# Patient Record
Sex: MALE | URBAN - METROPOLITAN AREA
[De-identification: names, ages, dates, MRNs, and addresses within clinical notes are randomized per-mention and may not be internally consistent; named-entity substitution may affect disease eponyms.]

---

## 2023-09-29 ENCOUNTER — APPOINTMENT (RX ONLY)
Dept: URBAN - METROPOLITAN AREA CLINIC 114 | Facility: CLINIC | Age: 23
Setting detail: DERMATOLOGY
End: 2023-09-29

## 2023-09-29 DIAGNOSIS — B35.1 TINEA UNGUIUM: ICD-10-CM

## 2023-09-29 DIAGNOSIS — D22 MELANOCYTIC NEVI: ICD-10-CM

## 2023-09-29 DIAGNOSIS — B36.0 PITYRIASIS VERSICOLOR: ICD-10-CM

## 2023-09-29 PROBLEM — D22.71 MELANOCYTIC NEVI OF RIGHT LOWER LIMB, INCLUDING HIP: Status: ACTIVE | Noted: 2023-09-29

## 2023-09-29 PROCEDURE — ? ORDER TESTS

## 2023-09-29 PROCEDURE — ? PRESCRIPTION

## 2023-09-29 PROCEDURE — ? PHOTO-DOCUMENTATION

## 2023-09-29 PROCEDURE — 99203 OFFICE O/P NEW LOW 30 MIN: CPT

## 2023-09-29 PROCEDURE — 87220 TISSUE EXAM FOR FUNGI: CPT

## 2023-09-29 PROCEDURE — ? OBSERVATION AND MEASURE

## 2023-09-29 PROCEDURE — ? KOH PREP

## 2023-09-29 PROCEDURE — ? TREATMENT REGIMEN

## 2023-09-29 PROCEDURE — ? COUNSELING

## 2023-09-29 RX ORDER — KETOCONAZOLE 20 MG/G
CREAM TOPICAL QD
Qty: 60 | Refills: 5 | Status: ERX | COMMUNITY
Start: 2023-09-29

## 2023-09-29 RX ORDER — EFINACONAZOLE 100 MG/ML
SOLUTION TOPICAL AS DIRECTED
Qty: 8 | Refills: 6 | Status: CANCELLED

## 2023-09-29 RX ORDER — KETOCONAZOLE 20 MG/ML
SHAMPOO, SUSPENSION TOPICAL QD
Qty: 120 | Refills: 5 | Status: ERX | COMMUNITY
Start: 2023-09-29

## 2023-09-29 RX ORDER — TERBINAFINE HYDROCHLORIDE 250 MG/1
TABLET ORAL ONCE A DAY
Qty: 45 | Refills: 1 | Status: CANCELLED

## 2023-09-29 RX ORDER — FLUCONAZOLE 150 MG/1
TABLET ORAL
Qty: 4 | Refills: 1 | Status: ERX | COMMUNITY
Start: 2023-09-29

## 2023-09-29 RX ADMIN — KETOCONAZOLE: 20 CREAM TOPICAL at 00:00

## 2023-09-29 RX ADMIN — FLUCONAZOLE: 150 TABLET ORAL at 00:00

## 2023-09-29 RX ADMIN — KETOCONAZOLE: 20 SHAMPOO, SUSPENSION TOPICAL at 00:00

## 2023-09-29 ASSESSMENT — LOCATION SIMPLE DESCRIPTION DERM
LOCATION SIMPLE: LEFT UPPER BACK
LOCATION SIMPLE: UPPER BACK
LOCATION SIMPLE: RIGHT GREAT TOE
LOCATION SIMPLE: RIGHT THIGH

## 2023-09-29 ASSESSMENT — LOCATION DETAILED DESCRIPTION DERM
LOCATION DETAILED: LEFT SUPERIOR UPPER BACK
LOCATION DETAILED: RIGHT ANTERIOR PROXIMAL THIGH
LOCATION DETAILED: RIGHT GREAT TOENAIL
LOCATION DETAILED: INFERIOR THORACIC SPINE

## 2023-09-29 ASSESSMENT — LOCATION ZONE DERM
LOCATION ZONE: TRUNK
LOCATION ZONE: TOENAIL
LOCATION ZONE: LEG

## 2023-09-29 NOTE — PROCEDURE: ORDER TESTS
Billing Type: Third-Party Bill
Expected Date Of Service: 09/29/2023
Performing Laboratory: [id number]
Bill For Surgical Tray: no

## 2023-09-29 NOTE — PROCEDURE: TREATMENT REGIMEN
Initiate Treatment: Will prescribe terbinafine once TV resolves.
Detail Level: Zone
Plan: Pt declined topical antifungal rx.

## 2023-09-29 NOTE — PROCEDURE: COUNSELING
Sunscreen Recommendation Label Override: Broad Spectrum Suncreen SPF 30+
Detail Level: Detailed
Detail Level: Zone

## 2023-09-29 NOTE — PROCEDURE: MIPS QUALITY
Quality 110: Preventive Care And Screening: Influenza Immunization: Influenza Immunization previously received during influenza season
Quality 226: Preventive Care And Screening: Tobacco Use: Screening And Cessation Intervention: Patient screened for tobacco use and is an ex/non-smoker
Detail Level: Detailed
Quality 431: Preventive Care And Screening: Unhealthy Alcohol Use - Screening: Patient not identified as an unhealthy alcohol user when screened for unhealthy alcohol use using a systematic screening method
Quality 402: Tobacco Use And Help With Quitting Among Adolescents: Patient screened for tobacco and never smoked

## 2023-09-29 NOTE — PROCEDURE: PHOTO-DOCUMENTATION
Photo Preface (Leave Blank If You Do Not Want): Photographs were obtained today for objective monitoring
Detail Level: Detailed

## 2023-10-20 ENCOUNTER — APPOINTMENT (RX ONLY)
Dept: URBAN - METROPOLITAN AREA CLINIC 114 | Facility: CLINIC | Age: 23
Setting detail: DERMATOLOGY
End: 2023-10-20

## 2023-10-20 DIAGNOSIS — B35.1 TINEA UNGUIUM: ICD-10-CM

## 2023-10-20 PROCEDURE — ? TREATMENT REGIMEN

## 2023-10-20 PROCEDURE — ? PRESCRIPTION

## 2023-10-20 PROCEDURE — ? COUNSELING

## 2023-10-20 PROCEDURE — ? ORDER TESTS

## 2023-10-20 ASSESSMENT — LOCATION SIMPLE DESCRIPTION DERM: LOCATION SIMPLE: RIGHT GREAT TOE

## 2023-10-20 ASSESSMENT — LOCATION DETAILED DESCRIPTION DERM: LOCATION DETAILED: RIGHT GREAT TOENAIL

## 2023-10-20 ASSESSMENT — LOCATION ZONE DERM: LOCATION ZONE: TOENAIL

## 2023-10-20 NOTE — PROCEDURE: TREATMENT REGIMEN
Initiate Treatment: Will prescribe terbinafine once we receive bw results.
Detail Level: Zone
Plan: Pt declined topical antifungal rx.

## 2023-10-20 NOTE — PROCEDURE: ORDER TESTS
Billing Type: Third-Party Bill
Expected Date Of Service: 09/29/2023
Performing Laboratory: [id number]
Bill For Surgical Tray: no

## 2023-11-03 ENCOUNTER — APPOINTMENT (RX ONLY)
Dept: URBAN - METROPOLITAN AREA CLINIC 114 | Facility: CLINIC | Age: 23
Setting detail: DERMATOLOGY
End: 2023-11-03

## 2023-11-03 DIAGNOSIS — B35.1 TINEA UNGUIUM: ICD-10-CM

## 2023-11-03 PROCEDURE — ? ORDER TESTS

## 2023-11-03 PROCEDURE — ? PRESCRIPTION

## 2023-11-03 PROCEDURE — ? TREATMENT REGIMEN

## 2023-11-03 PROCEDURE — ? COUNSELING

## 2023-11-03 RX ORDER — TERBINAFINE HYDROCHLORIDE 250 MG/1
TABLET ORAL ONCE A DAY
Qty: 45 | Refills: 0 | Status: ERX | COMMUNITY
Start: 2023-11-03

## 2023-11-03 RX ADMIN — TERBINAFINE HYDROCHLORIDE: 250 TABLET ORAL at 00:00

## 2023-11-03 ASSESSMENT — LOCATION SIMPLE DESCRIPTION DERM: LOCATION SIMPLE: RIGHT GREAT TOE

## 2023-11-03 ASSESSMENT — LOCATION ZONE DERM: LOCATION ZONE: TOENAIL

## 2023-11-03 ASSESSMENT — LOCATION DETAILED DESCRIPTION DERM: LOCATION DETAILED: RIGHT GREAT TOENAIL

## 2023-11-03 NOTE — PROCEDURE: ORDER TESTS
Billing Type: Third-Party Bill
Expected Date Of Service: 09/29/2023
Performing Laboratory: -[id number]
Bill For Surgical Tray: no

## 2024-04-15 NOTE — PROCEDURE: KOH PREP
Detail Level: Detailed
Cpt Desired: 69252
Showing: spores with pseudohyphae
Koh Intro Text (From The.....): A KOH prep was ordered and evaluated from the
Koh Procedure Text (Tissue Harvesting Technique): A 10-blade scalpel was used to scrape the skin. The skin scrapings were placed on a glass slide, covered with a coverslip and a KOH solution was applied.
No